# Patient Record
Sex: FEMALE | Race: ASIAN | NOT HISPANIC OR LATINO | Employment: FULL TIME | URBAN - METROPOLITAN AREA
[De-identification: names, ages, dates, MRNs, and addresses within clinical notes are randomized per-mention and may not be internally consistent; named-entity substitution may affect disease eponyms.]

---

## 2017-02-06 ENCOUNTER — OFFICE VISIT (OUTPATIENT)
Dept: INTERNAL MEDICINE | Facility: MEDICAL CENTER | Age: 22
End: 2017-02-06
Payer: COMMERCIAL

## 2017-02-06 VITALS
HEART RATE: 62 BPM | SYSTOLIC BLOOD PRESSURE: 106 MMHG | DIASTOLIC BLOOD PRESSURE: 70 MMHG | OXYGEN SATURATION: 98 % | TEMPERATURE: 98.6 F | RESPIRATION RATE: 12 BRPM | WEIGHT: 125.12 LBS | BODY MASS INDEX: 23.44 KG/M2

## 2017-02-06 DIAGNOSIS — N94.6 DYSMENORRHEA: ICD-10-CM

## 2017-02-06 PROCEDURE — 99213 OFFICE O/P EST LOW 20 MIN: CPT | Mod: GE | Performed by: INTERNAL MEDICINE

## 2017-02-06 NOTE — MR AVS SNAPSHOT
Ella Eldridge   2017 4:15 PM   Office Visit   MRN: [id number]    Department:  [facility]   Dept Phone:  [phone]    Description:  Female : 1995   Provider:  Luther Merino M.D.           Reason for Visit     Menstrual Problem Pain x6 yrs    Letter for School/Work FMLA Paperwork      Allergies as of 2017     No Known Allergies      You were diagnosed with     Dysmenorrhea   [625.3.ICD-9-CM]         Vital Signs     Blood Pressure Pulse Temperature Respirations Weight Oxygen Saturation    106/70 mmHg 62 37 °C (98.6 °F) 12 56.756 kg (125 lb 2 oz) 98%    Smoking Status                   Current Every Day Smoker           Basic Information     Date Of Birth Sex Race Ethnicity Preferred Language    1995 Female  Non- English      Your appointments     Mar 14, 2017  1:30 PM   New Patient with Davi Guzman M.D.   [facility] [address], [address]  [facility] [id number] [phone]              Problem List              ICD-10-CM Priority Class Noted - Resolved    Dysmenorrhea N94.6   2016 - Present    Vegan Z78.9   2016 - Present      Health Maintenance        Date Due Completion Dates    IMM HEP A VACCINE (1 of 2 - Standard Series) 1997 ---    IMM VARICELLA (CHICKENPOX) VACCINE (2 of 2 - 2 Dose Childhood Series) 10/10/2000 2000    IMM HPV VACCINE (1 of 3 - Female 3 Dose Series) 2007 ---    IMM DTaP/Tdap/Td Vaccine (6 - Tdap) 2007, 2000, 1997, 1996, 1996    IMM MENINGOCOCCAL VACCINE (MCV4) (1 of 1) 2012 ---    IMM INFLUENZA (1) 2016 ---            Current Immunizations     Dtap Vaccine 2001, 2000, 1997, 1996, 1996    HIB Vaccine(PEDVAX) 2000, 1997, 1996, 1996    Hepatitis B Vaccine Recombivax (Adol/Adult) 1997, 1996, 1996    IPV 2000, 2000    MMR Vaccine 2000, 2000    OPV -  Historical Data 1996, 1996    Varicella Vaccine Live 7/18/2000      Below and/or attached are the medications your provider expects you to take. Review all of your home medications and newly ordered medications with your provider and/or pharmacist. Follow medication instructions as directed by your provider and/or pharmacist. Please keep your medication list with you and share with your provider. Update the information when medications are discontinued, doses are changed, or new medications (including over-the-counter products) are added; and carry medication information at all times in the event of emergency situations     Allergies:  No Known Allergies          Medications  Valid as of: February 06, 2017 -  4:34 PM    Generic Name Brand Name Tablet Size Instructions for use    .                 Medicines prescribed today were sent to:     [facility] [address] [place] [address] - [address] [facility] [place]    [address] [facility] [address]    Phone: [phone] Fax: [phone]    Open 24 Hours?: Yes      Medication refill instructions:       If your prescription bottle indicates you have medication refills left, it is not necessary to call your provider’s office. Please contact your pharmacy and they will refill your medication.    If your prescription bottle indicates you do not have any refills left, you may request refills at any time through one of the following ways: The online TickTickTickets system (except Urgent Care), by calling your provider’s office, or by asking your pharmacy to contact your provider’s office with a refill request. Medication refills are processed only during regular business hours and may not be available until the next business day. Your provider may request additional information or to have a follow-up visit with you prior to refilling your medication.   *Please Note: Medication refills are assigned a new Rx number when refilled electronically. Your  pharmacy may indicate that no refills were authorized even though a new prescription for the same medication is available at the pharmacy. Please request the medicine by name with the pharmacy before contacting your provider for a refill.        Instructions    Get labs done  Go see gynecologist  Follow up with Dr. Jennifer brown          [employer] Access Code: [email or web address]  Expires: 2/13/2017  9:38 AM    [employer]  A secure, online tool to manage your health information     "[employer]’s [employer]® is a secure, online tool that connects you to your personalized health information from the privacy of your home -- day or night - making it very easy for you to manage your healthcare. Once the activation process is completed, you can even access your medical information using the [employer] estella, which is available for free in the Apple Estella store or Google Play store.     [employer] provides the following levels of access (as shown below):   My Chart Features   [facility] Primary Care Doctor [facility]  Specialists [facility]  Urgent  Care Non-[facility]  Primary Care  Doctor   Email your healthcare team securely and privately 24/7 X X X    Manage appointments: schedule your next appointment; view details of past/upcoming appointments X      Request prescription refills. X      View recent personal medical records, including lab and immunizations X X X X   View health record, including health history, allergies, medications X X X X   Read reports about your outpatient visits, procedures, consult and ER notes X X X X   See your discharge summary, which is a recap of your hospital and/or ER visit that includes your diagnosis, lab results, and care plan. X X       How to register for [employer]:  1. Go to  [email or web address].  2. Click on the Sign Up Now box, which takes you to the New Member Sign Up page. You will need to provide the following information:  a. Enter your [employer] Access Code exactly as it appears at the top of this  page. (You will not need to use this code after you’ve completed the sign-up process. If you do not sign up before the expiration date, you must request a new code.)   b. Enter your date of birth.   c. Enter your home email address.   d. Click Submit, and follow the next screen’s instructions.  3. Create a "[employer] ID. This will be your "[employer] login ID and cannot be changed, so think of one that is secure and easy to remember.  4. Create a "[employer] password. You can change your password at any time.  5. Enter your Password Reset Question and Answer. This can be used at a later time if you forget your password.   6. Enter your e-mail address. This allows you to receive e-mail notifications when new information is available in "[employer]  7. Click Sign Up. You can now view your health information.    For assistance activating your "[employer] account, call [phone]

## 2017-02-07 NOTE — PROGRESS NOTES
Established Patient    Ella presents today with the following:    CC: Dysmenorrhea    HPI: Patient is a 20 yo F with a past medical history significant for dysmenorrhea which has been occuring over the past 2 years. She recently established with PCP and began workup for this. She has labwork ordered and gynecology referral in but has not done either yet. She misses work about 2 days out of every month and is here for paperwork for her work excusing her while this workup is being performed. Patient otherwise denies any chest pain, shortness of breath, nausea, vomiting, dizziness, abdominal pain, fever, chills, loss of consciousness, headache, bleeding, seizure or dysuria.    Patient Active Problem List    Diagnosis Date Noted   • Dysmenorrhea 12/05/2016   • Vegan 12/05/2016       No current outpatient prescriptions on file.     No current facility-administered medications for this visit.       ROS: As per HPI. Additional pertinent symptoms as noted below.    /70 mmHg  Pulse 62  Temp(Src) 37 °C (98.6 °F)  Resp 12  Wt 56.756 kg (125 lb 2 oz)  SpO2 98%    Physical Exam   Constitutional:  oriented to person, place, and time. No distress.   Eyes: Pupils are equal, round, and reactive to light. No scleral icterus.  Neck: Neck supple. No thyromegaly present.   Cardiovascular: Normal rate, regular rhythm and normal heart sounds.  Exam reveals no gallop and no friction rub.  No murmur heard.  Pulmonary/Chest: Breath sounds normal. Chest wall is not dull to percussion.   Musculoskeletal:   no edema.   Lymphadenopathy: no cervical adenopathy  Neurological: alert and oriented to person, place, and time.   Skin: No cyanosis. Nails show no clubbing.    Assessment and Plan    Problem List Items Addressed This Visit     Dysmenorrhea     Currently being worked up, has not had labwork performed or seen gynecology  Filled out work form to excuse once a month for 2 days for the next 6 months while workup is  performed  Patient needs to complete workup, renewal of leave will need to be performed by gynecology                 Followup: No Follow-up on file.      Signed by: Luther Merino M.D.

## 2017-02-07 NOTE — ASSESSMENT & PLAN NOTE
Currently being worked up, has not had labwork performed or seen gynecology  Filled out work form to excuse once a month for 2 days for the next 6 months while workup is performed  Patient needs to complete workup, renewal of leave will need to be performed by gynecology

## 2018-05-15 ENCOUNTER — OFFICE VISIT (OUTPATIENT)
Dept: INTERNAL MEDICINE | Facility: MEDICAL CENTER | Age: 23
End: 2018-05-15
Payer: COMMERCIAL

## 2018-05-15 VITALS
TEMPERATURE: 98.7 F | DIASTOLIC BLOOD PRESSURE: 58 MMHG | HEIGHT: 61 IN | HEART RATE: 62 BPM | OXYGEN SATURATION: 98 % | BODY MASS INDEX: 23.98 KG/M2 | SYSTOLIC BLOOD PRESSURE: 96 MMHG | WEIGHT: 127 LBS

## 2018-05-15 DIAGNOSIS — Z78.9 VEGAN: ICD-10-CM

## 2018-05-15 DIAGNOSIS — N94.6 DYSMENORRHEA: ICD-10-CM

## 2018-05-15 DIAGNOSIS — R53.1 WEAKNESS: ICD-10-CM

## 2018-05-15 DIAGNOSIS — E46 PROTEIN-CALORIE MALNUTRITION, UNSPECIFIED SEVERITY (HCC): ICD-10-CM

## 2018-05-15 PROCEDURE — 99213 OFFICE O/P EST LOW 20 MIN: CPT | Mod: GE | Performed by: INTERNAL MEDICINE

## 2018-05-15 ASSESSMENT — PATIENT HEALTH QUESTIONNAIRE - PHQ9
5. POOR APPETITE OR OVEREATING: 1 - SEVERAL DAYS
CLINICAL INTERPRETATION OF PHQ2 SCORE: 1
SUM OF ALL RESPONSES TO PHQ QUESTIONS 1-9: 8

## 2018-05-15 NOTE — PROGRESS NOTES
Established Patient    Ella presents today with the following:    CC: Weakness, fatigue, dietary issues, pre-menstrual syndrome    HPI: 22 year old female with a PMH of veganism and pre-menstrual syndrome presents to the clinic today with dietary questions and complaints of painful menstruation.    Veganism: Miss Tuttle was previously seen by myself in 12/2016 with complaints stemming from the fact that she was vegan, but she had many dietary questions and was worried about dietary deficiencies. She has adhered to a strict vegan diet for the past 5 years and is concerned about her nutritional status and whether its causing any deficiencies. She began practicing veganism as an attempt to become more healthy and lose weight. She eats lots of raw fresh fruits and vegetables, rice and avocados, but notes a lack of variation in her diet and consuming only around 1000 calories. She does not consistently take those vitamin replacment therapy. Recently the patient has noticed that she is becoming more weak, dizzy and fatigued than usual especially during rigorous exercise and she is suspicious that she is not getting enough nutrients from her diet. She has an exercise regimen that consisting of cardio and strength training 5 days a week for 30 minutes. She is requesting a referral to a nutritionist for dietary help.     Dysmenorrhea: For the past 3 years the patient states that she has had painful menses that has caused her to miss work once every once in a while, but for the most part her work is understanding of her plight.. Her pain is located suprapubic without radiation and is rated at 5/10 at its worst. Her menses usually last 4-5 days, but she states that only the first day is painful and has been using ibuprofen for pain control with poor response. She describes the pain as dull and crippling. She states that she is currently menstruating and there is no chance that she is pregnant. Her periods began at age 13  "and she has never used birth control, nor is she interesting in initiating it at today' visit. She denies menorrhagia and currently has no urinary symptoms. Family history is positive for heavy, painful menses in her mother.     Patient Active Problem List    Diagnosis Date Noted   • Malnourished (HCC) 05/15/2018   • Dysmenorrhea 12/05/2016   • Vegan 12/05/2016       No current outpatient prescriptions on file.     No current facility-administered medications for this visit.        ROS: As per HPI. Additional pertinent symptoms as noted below.        BP (!) 96/58   Pulse 62   Temp 37.1 °C (98.7 °F)   Ht 1.556 m (5' 1.26\")   Wt 57.6 kg (127 lb)   SpO2 98%   BMI 23.79 kg/m²     Physical Exam   General:  Alert and oriented, No apparent distress. Appears stated age, skin abnormally pale.  Eyes: Pupils equal and reactive, EOMI, no scleral icterus, no injected conjunctiva.  Throat: Clear no erythema or exudates noted.  Neck: Supple. No lymphadenopathy noted. Thyroid not enlarged.  Lungs: Clear to auscultation and percussion bilaterally. No wheezes, crackles, rhales.  Cardiovascular: Regular rate and rhythm. No murmurs, rubs or gallops.  Abdomen:  Benign. No rebound or guarding noted.  Extremities: No clubbing, cyanosis, edema.  Skin: Clear. No rash or suspicious skin lesions noted. Pale.      Assessment and Plan    1. Vegan  - Suspect dietary malnutrition contributing to weakness, fatigue, dizziness. Does appear pale.  - Diet consists of vegan menu with 1000 calories daily  - Denies any bulimia or anorexia  - Nutrition referral  - Check CBC for anemia, CMP for electrolyte abnormalities, B12, folate, Fe panel, Vit D, TSH, pre-albumin     2. Dysmenorrhea  - Ibuprofen PRN pain  - Refused oral contraception  - Offer SSRI at next visit  - Needs PAP. Offer at next visit.    PLAN: Check CBC, CMP, Fe panel, B12, folate, Vit D, pre-albumin. Refer to nutrition services. F/u in 5 weeks. Offer PAP at next visit (previously " refused).        Signed by: Keith Rodriguez M.D.

## 2018-05-26 ENCOUNTER — HOSPITAL ENCOUNTER (OUTPATIENT)
Dept: LAB | Facility: MEDICAL CENTER | Age: 23
End: 2018-05-26
Attending: INTERNAL MEDICINE
Payer: COMMERCIAL

## 2018-05-26 LAB
ALBUMIN SERPL BCP-MCNC: 4.2 G/DL (ref 3.2–4.9)
ALBUMIN/GLOB SERPL: 1.7 G/DL
ALP SERPL-CCNC: 52 U/L (ref 30–99)
ALT SERPL-CCNC: 17 U/L (ref 2–50)
ANION GAP SERPL CALC-SCNC: 7 MMOL/L (ref 0–11.9)
AST SERPL-CCNC: 22 U/L (ref 12–45)
BASOPHILS # BLD AUTO: 0.9 % (ref 0–1.8)
BASOPHILS # BLD: 0.05 K/UL (ref 0–0.12)
BILIRUB SERPL-MCNC: 1 MG/DL (ref 0.1–1.5)
BUN SERPL-MCNC: 9 MG/DL (ref 8–22)
CALCIUM SERPL-MCNC: 9.4 MG/DL (ref 8.5–10.5)
CHLORIDE SERPL-SCNC: 107 MMOL/L (ref 96–112)
CO2 SERPL-SCNC: 26 MMOL/L (ref 20–33)
CREAT SERPL-MCNC: 0.63 MG/DL (ref 0.5–1.4)
EOSINOPHIL # BLD AUTO: 0.19 K/UL (ref 0–0.51)
EOSINOPHIL NFR BLD: 3.5 % (ref 0–6.9)
ERYTHROCYTE [DISTWIDTH] IN BLOOD BY AUTOMATED COUNT: 40.4 FL (ref 35.9–50)
FERRITIN SERPL-MCNC: 8.8 NG/ML (ref 10–291)
FOLATE SERPL-MCNC: >24 NG/ML
GLOBULIN SER CALC-MCNC: 2.5 G/DL (ref 1.9–3.5)
GLUCOSE SERPL-MCNC: 84 MG/DL (ref 65–99)
HCT VFR BLD AUTO: 41.6 % (ref 37–47)
HGB BLD-MCNC: 13.8 G/DL (ref 12–16)
IMM GRANULOCYTES # BLD AUTO: 0.01 K/UL (ref 0–0.11)
IMM GRANULOCYTES NFR BLD AUTO: 0.2 % (ref 0–0.9)
IRON SATN MFR SERPL: 50 % (ref 15–55)
IRON SERPL-MCNC: 221 UG/DL (ref 40–170)
LYMPHOCYTES # BLD AUTO: 2.36 K/UL (ref 1–4.8)
LYMPHOCYTES NFR BLD: 43.6 % (ref 22–41)
MCH RBC QN AUTO: 30.1 PG (ref 27–33)
MCHC RBC AUTO-ENTMCNC: 33.2 G/DL (ref 33.6–35)
MCV RBC AUTO: 90.8 FL (ref 81.4–97.8)
MONOCYTES # BLD AUTO: 0.33 K/UL (ref 0–0.85)
MONOCYTES NFR BLD AUTO: 6.1 % (ref 0–13.4)
NEUTROPHILS # BLD AUTO: 2.47 K/UL (ref 2–7.15)
NEUTROPHILS NFR BLD: 45.7 % (ref 44–72)
NRBC # BLD AUTO: 0 K/UL
NRBC BLD-RTO: 0 /100 WBC
PLATELET # BLD AUTO: 175 K/UL (ref 164–446)
PMV BLD AUTO: 11.2 FL (ref 9–12.9)
POTASSIUM SERPL-SCNC: 4 MMOL/L (ref 3.6–5.5)
PREALB SERPL-MCNC: 18 MG/DL (ref 18–38)
PROT SERPL-MCNC: 6.7 G/DL (ref 6–8.2)
RBC # BLD AUTO: 4.58 M/UL (ref 4.2–5.4)
SODIUM SERPL-SCNC: 140 MMOL/L (ref 135–145)
T4 FREE SERPL-MCNC: 0.93 NG/DL (ref 0.53–1.43)
TIBC SERPL-MCNC: 440 UG/DL (ref 250–450)
TSH SERPL DL<=0.005 MIU/L-ACNC: 3.17 UIU/ML (ref 0.38–5.33)
VIT B12 SERPL-MCNC: 603 PG/ML (ref 211–911)
WBC # BLD AUTO: 5.4 K/UL (ref 4.8–10.8)

## 2018-05-26 PROCEDURE — 84134 ASSAY OF PREALBUMIN: CPT

## 2018-05-26 PROCEDURE — 84439 ASSAY OF FREE THYROXINE: CPT

## 2018-05-26 PROCEDURE — 82746 ASSAY OF FOLIC ACID SERUM: CPT

## 2018-05-26 PROCEDURE — 85025 COMPLETE CBC W/AUTO DIFF WBC: CPT

## 2018-05-26 PROCEDURE — 84443 ASSAY THYROID STIM HORMONE: CPT

## 2018-05-26 PROCEDURE — 82607 VITAMIN B-12: CPT

## 2018-05-26 PROCEDURE — 83540 ASSAY OF IRON: CPT

## 2018-05-26 PROCEDURE — 80053 COMPREHEN METABOLIC PANEL: CPT

## 2018-05-26 PROCEDURE — 82728 ASSAY OF FERRITIN: CPT

## 2018-05-26 PROCEDURE — 83550 IRON BINDING TEST: CPT

## 2018-05-26 PROCEDURE — 36415 COLL VENOUS BLD VENIPUNCTURE: CPT

## 2018-06-19 ENCOUNTER — OFFICE VISIT (OUTPATIENT)
Dept: INTERNAL MEDICINE | Facility: MEDICAL CENTER | Age: 23
End: 2018-06-19
Payer: COMMERCIAL

## 2018-06-19 VITALS
OXYGEN SATURATION: 98 % | SYSTOLIC BLOOD PRESSURE: 101 MMHG | HEART RATE: 60 BPM | TEMPERATURE: 99.2 F | BODY MASS INDEX: 24.35 KG/M2 | RESPIRATION RATE: 14 BRPM | HEIGHT: 61 IN | DIASTOLIC BLOOD PRESSURE: 69 MMHG | WEIGHT: 129 LBS

## 2018-06-19 DIAGNOSIS — N94.6 DYSMENORRHEA: ICD-10-CM

## 2018-06-19 DIAGNOSIS — Z78.9 VEGAN: ICD-10-CM

## 2018-06-19 PROCEDURE — 99213 OFFICE O/P EST LOW 20 MIN: CPT | Mod: GE | Performed by: INTERNAL MEDICINE

## 2018-06-20 PROBLEM — E46 MALNOURISHED (HCC): Status: RESOLVED | Noted: 2018-05-15 | Resolved: 2018-06-20

## 2018-06-20 NOTE — PROGRESS NOTES
"Established Patient    Ella presents today with the following:    CC: 5 week follow up for dietary issues, pre-menstrual syndrome, lab review    HPI: 22 year old female with a PMH of veganism and pre-menstrual syndrome presents to the clinic today for lab review and mgmt of vegan diet.     Veganism: Labs appear within normal limits, pre-albumin was on the low side of normal, patient feels improved and has made some dietary questions, but wanted to review her labs prior to scheduling a nutrition appt. A dietary referral was placed previously per patient request. At this time she would like to pursue an appt as she would like to maintain her current vegan lifestyle, but would like dietary help.     Dysmenorrhea: Better controlled since last being seen. She continues to refuse any treatment for her symptoms including birth control. Patient was offered a vaginal exam with PAP smear and did not wish to pursue that currently. A referral was placed for gynecology.    Patient Active Problem List    Diagnosis Date Noted   • Dysmenorrhea 12/05/2016   • Vegan 12/05/2016       No current outpatient prescriptions on file.     No current facility-administered medications for this visit.        ROS: As per HPI. Additional pertinent symptoms as noted below.      /69   Pulse 60   Temp 37.3 °C (99.2 °F)   Resp 14   Ht 1.556 m (5' 1.26\")   Wt 58.5 kg (129 lb)   SpO2 98%   BMI 24.17 kg/m²     Physical Exam   General:  Alert and oriented, No apparent distress. Appears stated age, skin abnormally pale.  Eyes: Pupils equal and reactive, EOMI, no scleral icterus, no injected conjunctiva.  Throat: Clear no erythema or exudates noted.  Neck: Supple. No lymphadenopathy noted. Thyroid not enlarged.  Lungs: Clear to auscultation and percussion bilaterally. No wheezes, crackles, rhales.  Cardiovascular: Regular rate and rhythm. No murmurs, rubs or gallops.  Abdomen:  Benign. No rebound or guarding noted.  Extremities: No " clubbing, cyanosis, edema.  Skin: Clear. No rash or suspicious skin lesions noted. Pale.      Assessment and Plan    1. Vegan  - Pre-albumin borderline low  - Diet consists of vegan menu with 1000 calories daily  - Denies any bulimia or anorexia  - Nutrition referral placed, patient still wishes to proceed as she needs help with diet  - Vitamins normal, no replacement necessary     2. Dysmenorrhea  - Ibuprofen PRN pain  - Refused oral contraception  - Offered SSRI for dysmenorrhea/depression, patient refused.  - Needs PAP. Refused at todays visit, but agreed to a gyneceology referral to establish.     PLAN: Referred to gyn, follow up PRN.      Signed by: Keith Rodriguez M.D.

## 2018-08-10 ENCOUNTER — OFFICE VISIT (OUTPATIENT)
Dept: INTERNAL MEDICINE | Facility: MEDICAL CENTER | Age: 23
End: 2018-08-10
Payer: COMMERCIAL

## 2018-08-10 VITALS
SYSTOLIC BLOOD PRESSURE: 106 MMHG | TEMPERATURE: 98.4 F | HEIGHT: 61 IN | OXYGEN SATURATION: 97 % | DIASTOLIC BLOOD PRESSURE: 68 MMHG | HEART RATE: 67 BPM | WEIGHT: 129 LBS | BODY MASS INDEX: 24.35 KG/M2

## 2018-08-10 DIAGNOSIS — T14.8XXA MUSCULOSKELETAL STRAIN: ICD-10-CM

## 2018-08-10 DIAGNOSIS — Z87.440 HISTORY OF UTI: ICD-10-CM

## 2018-08-10 DIAGNOSIS — M54.50 ACUTE LEFT-SIDED LOW BACK PAIN WITHOUT SCIATICA: ICD-10-CM

## 2018-08-10 LAB
APPEARANCE UR: CLEAR
COLOR UR AUTO: NORMAL
GLUCOSE UR STRIP.AUTO-MCNC: NORMAL MG/DL
KETONES UR STRIP.AUTO-MCNC: NORMAL MG/DL
LEUKOCYTE ESTERASE UR QL STRIP.AUTO: NORMAL
NITRITE UR QL STRIP.AUTO: NORMAL
PH UR STRIP.AUTO: 7 [PH] (ref 5–8)
PROT UR QL STRIP: NORMAL MG/DL
RBC UR QL AUTO: NORMAL
SP GR UR STRIP.AUTO: 1.01

## 2018-08-10 PROCEDURE — 99213 OFFICE O/P EST LOW 20 MIN: CPT | Mod: GE | Performed by: INTERNAL MEDICINE

## 2018-08-10 PROCEDURE — 81002 URINALYSIS NONAUTO W/O SCOPE: CPT | Mod: GC | Performed by: INTERNAL MEDICINE

## 2018-08-10 NOTE — LETTER
August 10, 2018    To Whom It May Concern:         This is confirmation that Ella Eldridge attended her scheduled appointment with Tye Brown M.D. on 8/10/18.         If you have any questions please do not hesitate to call me at the phone number listed below.    Sincerely,          Tye Brown M.D.  [phone]

## 2018-08-10 NOTE — PROGRESS NOTES
"      Established Patient    Ella presents today with the following:    CC: UTI like symptoms    HPI: pt is a 22 YO female PMH of veganism and pre-menstrual syndrome presents to the clinic for acute L sided back pain, with previous color changes of urine making her suspicious of having UTI. Pt has had UTI 4 yrs back, with similar sxs with fever ans chills.   Pt denies F/C, N/V, dysuria or urine color changes, although a week back she had dark urine which improved with hydration. No hx of renal stones.    [place] and having back pain since yesterday. No other joint pain     Patient Active Problem List    Diagnosis Date Noted   • Dysmenorrhea 12/05/2016   • Vegan 12/05/2016       No current outpatient prescriptions on file.     No current facility-administered medications for this visit.        ROS: As per HPI. Additional pertinent symptoms as noted below.    Constitutional: negative for fever/ chills,  Malaise, weight loss and diaphoresis.   HENT: Negative for nosebleeds and sore throat.    Eyes: Negative for blurred vision and redness, vision loss  Respiratory: Negative for cough, hemoptysis and shortness of breath.    Cardiovascular: Negative for chest pain, palpitations, orthopnea, VELASQUEZ and leg swelling   Gastrointestinal: Negative for heartburn, nausea, vomiting and abdominal pain.   Genitourinary: Negative for dysuria, frequency and hematuria.   Musculoskeletal: negative for joint swelling, pain. Negative for myalgias.  Skin: Negative for itching and rash.   Neurological: Negative for dizziness, tingling, sensory change, focal weakness, seizures and weakness.   Endo/Heme/Allergies: Does not bruise/bleed easily.   Psychiatric/Behavioral: Negative for depression and substance abuse.       /68   Pulse 67   Temp 37.4 °C (99.4 °F)   Ht 1.556 m (5' 1.26\")   Wt 58.5 kg (129 lb)   SpO2 97%   BMI 24.17 kg/m²     Physical Exam   Constitutional:  oriented to person, place, " and time. No distress.   Eyes: Pupils are equal, round, and reactive to light. No scleral icterus.  Neck: Neck supple. No thyromegaly present.   Cardiovascular: Normal rate, regular rhythm and normal heart sounds.  Exam reveals no gallop and no friction rub.  No murmur heard.  Pulmonary/Chest: Breath sounds normal. Chest wall is not dull to percussion.   Abdomen: Soft nontender, no suprapubic tenderness  CVA- reproducible right flank pain.  Neurological: alert and oriented to person, place, and time.   Skin: No cyanosis. Nails show no clubbing.    Note: I have reviewed all pertinent labs and diagnostic tests associated with this visit with specific comments listed under the assessment and plan below    Assessment and Plan    1. Musculoskeletal strain  2. Acute left-sided low back pain without sciatica  3. History of UTI  - pain appears to be MSK from acute strain being in the river.   - no dysuria, POC UA was negative for bactiuria, RBC, nitrates, LE. No tachycardia No stigma of UTI.  - Reassurance, heat and ice application, NSAIDs        Followup: Return if symptoms worsen or fail to improve.      Signed by: Tye Brown M.D.

## 2019-07-03 ENCOUNTER — HOSPITAL ENCOUNTER (OUTPATIENT)
Facility: MEDICAL CENTER | Age: 24
End: 2019-07-03
Attending: NURSE PRACTITIONER
Payer: COMMERCIAL

## 2019-07-03 ENCOUNTER — OFFICE VISIT (OUTPATIENT)
Dept: URGENT CARE | Facility: CLINIC | Age: 24
End: 2019-07-03
Payer: COMMERCIAL

## 2019-07-03 VITALS
BODY MASS INDEX: 23.41 KG/M2 | HEIGHT: 61 IN | OXYGEN SATURATION: 99 % | HEART RATE: 101 BPM | DIASTOLIC BLOOD PRESSURE: 64 MMHG | SYSTOLIC BLOOD PRESSURE: 112 MMHG | RESPIRATION RATE: 16 BRPM | WEIGHT: 124 LBS | TEMPERATURE: 99.2 F

## 2019-07-03 DIAGNOSIS — J02.9 EXUDATIVE PHARYNGITIS: ICD-10-CM

## 2019-07-03 LAB
INT CON NEG: NEGATIVE
INT CON POS: POSITIVE
S PYO AG THROAT QL: NEGATIVE

## 2019-07-03 PROCEDURE — 99204 OFFICE O/P NEW MOD 45 MIN: CPT | Performed by: NURSE PRACTITIONER

## 2019-07-03 PROCEDURE — 87880 STREP A ASSAY W/OPTIC: CPT | Performed by: NURSE PRACTITIONER

## 2019-07-03 PROCEDURE — 87070 CULTURE OTHR SPECIMN AEROBIC: CPT

## 2019-07-03 RX ORDER — PENICILLIN V POTASSIUM 500 MG/1
500 TABLET ORAL 3 TIMES DAILY
Qty: 30 TAB | Refills: 0 | Status: SHIPPED | OUTPATIENT
Start: 2019-07-03 | End: 2019-07-13

## 2019-07-03 NOTE — PROGRESS NOTES
Chief Complaint   Patient presents with   • Pharyngitis     x 1 day and states she noticed white spots on her tonsils and a little feverish last night       HISTORY OF PRESENT ILLNESS: Patient is a 23 y.o. female who presents today due to complaints of a sore throat for the past two days. Reports associated fever, chills, pain with swallowing. Pain is currently rated as mild. Denies associated congestion, cough, or difficulty breathing. She has tried OTC medications at home without much improvement. Notes several of her friends are ill with similar symptoms.      Patient Active Problem List    Diagnosis Date Noted   • Dysmenorrhea 12/05/2016   • Vegan 12/05/2016       Allergies:Patient has no known allergies.    Current Outpatient Prescriptions Ordered in [employer]   Medication Sig Dispense Refill   • penicillin v potassium (VEETID) 500 MG Tab Take 1 Tab by mouth 3 times a day for 10 days. 30 Tab 0     No current Epic-ordered facility-administered medications on file.        No past medical history on file.    Social History   Substance Use Topics   • Smoking status: Former Smoker     Packs/day: 0.25     Years: 8.00     Types: Cigarettes     Quit date: 5/15/2017   • Smokeless tobacco: Never Used   • Alcohol use Yes      Comment: occa       No family status information on file.   No family history on file.    ROS:  Review of Systems   Constitutional: Positive for fever, chills. Negative for weight loss and malaise/fatigue.   HENT: Positive for sore throat. Negative for ear pain, nosebleeds, congestion.   Eyes: Negative for vision changes.   Cardiovascular: Negative for chest pain, palpitations, orthopnea and leg swelling.   Respiratory: Negative for cough, sputum production, shortness of breath and wheezing.   Gastrointestinal: Negative for abdominal pain, nausea, vomiting or diarrhea.   Skin: Negative for rash, diaphoresis.     Exam:  /64 (BP Location: Left arm, Patient Position: Sitting, BP Cuff Size: Adult)    "Pulse (!) 101   Temp 37.3 °C (99.2 °F) (Temporal)   Resp 16   Ht 1.549 m (5' 1\")   Wt 56.2 kg (124 lb)   SpO2 99%   General: well-nourished, well-developed female in NAD  Head: normocephalic, atraumatic  Eyes: PERRLA, no conjunctival injection, acuity grossly intact, lids normal.  Ears: normal shape and symmetry, no tenderness, no discharge. External canals are without any significant edema or erythema. Tympanic membranes are without any inflammation, no effusion. Gross auditory acuity is intact.  Nose: symmetrical without tenderness, no discharge.  Mouth/Throat: reasonable hygiene. There is erythema, with exudates and tonsillar enlargement present.  Neck: no masses, range of motion within normal limits, no tracheal deviation. No obvious thyroid enlargement.   Lymph: bilateral anterior cervical adenopathy. No supraclavicular adenopathy.   Neuro: alert and oriented. Cranial nerves 1-12 grossly intact. No sensory deficit.   Cardiovascular: regular rate and rhythm. No edema.  Pulmonary: no distress. Chest is symmetrical with respiration, no wheezes, crackles, or rhonchi.   Musculoskeletal: no clubbing, appropriate muscle tone, gait is stable.  Skin: warm, dry, intact, no clubbing, no cyanosis, no rashes.   Psych: appropriate mood, affect, judgement.         Assessment/Plan:  1. Exudative pharyngitis  POCT Rapid Strep A    penicillin v potassium (VEETID) 500 MG Tab    CULTURE THROAT         POC strep negative      Antibiotic as directed due to appearance, culture obtained. Call phone number for results. OTC motrin or tylenol for pain/fever control. Hand hygiene. Increase fluid intake, rest. Warm salt water gargles.   Supportive care, differential diagnoses, and indications for immediate follow-up discussed with patient.   Pathogenesis of diagnosis discussed including typical length and natural progression.   Instructed to return to clinic or nearest emergency department for any change in condition, further " concerns, or worsening of symptoms.  Patient states understanding of the plan of care and discharge instructions.  Instructed to make an appointment, for follow up, with her primary care provider.        Please note that this dictation was created using voice recognition software. I have made every reasonable attempt to correct obvious errors, but I expect that there are errors of grammar and possibly content that I did not discover before finalizing the note.      DUNCAN Waller.

## 2019-07-05 LAB
BACTERIA SPEC RESP CULT: NORMAL
SIGNIFICANT IND 70042: NORMAL
SITE SITE: NORMAL
SOURCE SOURCE: NORMAL

## 2024-08-19 ENCOUNTER — OFFICE VISIT (OUTPATIENT)
Dept: MEDICAL GROUP | Facility: MEDICAL CENTER | Age: 29
End: 2024-08-19
Payer: COMMERCIAL

## 2024-08-19 VITALS
SYSTOLIC BLOOD PRESSURE: 82 MMHG | BODY MASS INDEX: 27.47 KG/M2 | WEIGHT: 145.5 LBS | HEIGHT: 61 IN | DIASTOLIC BLOOD PRESSURE: 58 MMHG

## 2024-08-19 DIAGNOSIS — Z76.89 ENCOUNTER TO ESTABLISH CARE: ICD-10-CM

## 2024-08-19 DIAGNOSIS — Z00.00 ENCOUNTER FOR PREVENTIVE CARE: ICD-10-CM

## 2024-08-19 DIAGNOSIS — H93.8X2 SENSATION OF FULLNESS IN LEFT EAR: ICD-10-CM

## 2024-08-19 DIAGNOSIS — N94.6 DYSMENORRHEA: ICD-10-CM

## 2024-08-19 PROCEDURE — 99214 OFFICE O/P EST MOD 30 MIN: CPT | Performed by: STUDENT IN AN ORGANIZED HEALTH CARE EDUCATION/TRAINING PROGRAM

## 2024-08-19 PROCEDURE — 3074F SYST BP LT 130 MM HG: CPT | Performed by: STUDENT IN AN ORGANIZED HEALTH CARE EDUCATION/TRAINING PROGRAM

## 2024-08-19 PROCEDURE — 3078F DIAST BP <80 MM HG: CPT | Performed by: STUDENT IN AN ORGANIZED HEALTH CARE EDUCATION/TRAINING PROGRAM

## 2024-08-19 ASSESSMENT — ENCOUNTER SYMPTOMS
PALPITATIONS: 0
FEVER: 0
CHILLS: 0
HEMOPTYSIS: 0
COUGH: 0

## 2024-08-19 ASSESSMENT — PATIENT HEALTH QUESTIONNAIRE - PHQ9: CLINICAL INTERPRETATION OF PHQ2 SCORE: 0

## 2024-08-19 NOTE — PROGRESS NOTES
Ella Eldridge is a 29 y.o. old female  who is here to establish care, referral to gynecology and ear fullness    Chief Complaint   Patient presents with    Establish Care    Ear Fullness        Problem   Sensation of Fullness in Left Ear    Reports sensation of fullness in her left ear  No hearing loss, no ear pain, no ear discharge     Encounter to Establish Care   Dysmenorrhea    Reports severe pain with menstrual cycles.  Pain starts on the onset of menstrual cycles and last for about 2 to 3 days.  She has been taking ibuprofen in the last few months which has helped the pain.  No increased bleeding during menstrual cycles or increased frequency of bleeding.           Review of Systems   Constitutional:  Negative for chills and fever.   Respiratory:  Negative for cough and hemoptysis.    Cardiovascular:  Negative for chest pain and palpitations.   Genitourinary:  Negative for dysuria.        Patient  has no past medical history on file.  Patient  has no past surgical history on file.  Family History   Problem Relation Age of Onset    Diabetes Mother     Schizophrenia Maternal Uncle     Diabetes Maternal Grandmother     Prostate cancer Maternal Grandfather     Parkinson's Disease Paternal Grandmother      Social History     Tobacco Use    Smoking status: Former     Current packs/day: 0.00     Average packs/day: 0.3 packs/day for 8.0 years (2.0 ttl pk-yrs)     Types: Cigarettes     Start date: 5/15/2009     Quit date: 5/15/2017     Years since quittin.2    Smokeless tobacco: Never   Vaping Use    Vaping status: Never Used   Substance Use Topics    Alcohol use: Yes     Comment: occa    Drug use: Not Currently     Types: Marijuana     Patient Active Problem List    Diagnosis Date Noted    Sensation of fullness in left ear 2024    Encounter to establish care 2024    Dysmenorrhea 2016    Vegan 2016     No current outpatient medications on file.     No current facility-administered  "medications for this visit.    (including changes today)  Allergies: Patient has no known allergies.    BP (!) 82/58   Ht 1.549 m (5' 1\")   Wt 66 kg (145 lb 8.1 oz)      Physical Exam  Constitutional:       Appearance: Normal appearance.   HENT:      Head: Normocephalic and atraumatic.      Comments: Increased cerumen in left ear     Right Ear: Tympanic membrane normal.      Left Ear: Tympanic membrane normal.   Cardiovascular:      Rate and Rhythm: Normal rate and regular rhythm.      Pulses: Normal pulses.      Heart sounds: No murmur heard.  Pulmonary:      Effort: Pulmonary effort is normal. No respiratory distress.      Breath sounds: Normal breath sounds. No wheezing.   Abdominal:      Palpations: Abdomen is soft.      Tenderness: There is no abdominal tenderness.   Skin:     General: Skin is warm.      Coloration: Skin is not jaundiced or pale.   Neurological:      General: No focal deficit present.      Mental Status: She is alert and oriented to person, place, and time.          Health maintenance:   Pap smear: Patient would like to schedule with her gynecologist  Bro Montoyaap-patient deferred at this time, wants to postpone.    No recent lab results to review    Assessment and plan:    Problem List Items Addressed This Visit       Dysmenorrhea     - Symptoms suggestive of for physiologic dysmenorrhea.  However patient states that her symptoms are severe and debilitating.  Also requesting referral to gynecology.  Discussed with patient regarding option for hormonal contraceptive pill, patient deferred.         Relevant Orders    Referral to Gynecology    Sensation of fullness in left ear     -Ear lavage         Relevant Orders    Ear Irrigation ([facility] Only)    Encounter to establish care     Reviewed past medical, surgical, family, personal history and medications.            Other Visit Diagnoses       Encounter for preventive care        Relevant Orders    Comp Metabolic Panel    CBC WITH DIFFERENTIAL "    Lipid Profile    TSH    VITAMIN D,25 HYDROXY (DEFICIENCY)    HEMOGLOBIN A1C    HIV AG/AB COMBO ASSAY SCREENING    HEP C VIRUS ANTIBODY                Return if symptoms worsen or fail to improve, for Labfollowup.          Please note that this dictation was created using voice recognition software. I have made every reasonable attempt to correct obvious errors, but I expect that there are errors of grammar and possibly content that I did not discover before finalizing the note.

## 2024-08-19 NOTE — ASSESSMENT & PLAN NOTE
- Symptoms suggestive of for physiologic dysmenorrhea.  However patient states that her symptoms are severe and debilitating.  Also requesting referral to gynecology.  Discussed with patient regarding option for hormonal contraceptive pill, patient deferred.

## 2024-08-19 NOTE — LETTER
August 19, 2024       Patient: Ella Eldridge   YOB: 1995   Date of Visit: 8/19/2024         To Whom It May Concern:    Ms. Ella Eldridge was at my office for her appointment at 8.20 AM. In my medical opinion, I recommend that Ella Eldridge can return to work after the appointment.    If you have any questions or concerns, please don't hesitate to call [phone]          Sincerely,          Latasha Coronel M.D.  Electronically Signed

## 2025-04-02 ENCOUNTER — HOSPITAL ENCOUNTER (OUTPATIENT)
Facility: MEDICAL CENTER | Age: 30
End: 2025-04-02
Attending: STUDENT IN AN ORGANIZED HEALTH CARE EDUCATION/TRAINING PROGRAM
Payer: COMMERCIAL

## 2025-04-02 LAB
25(OH)D3 SERPL-MCNC: 17 NG/ML (ref 30–100)
ALBUMIN SERPL BCP-MCNC: 4.5 G/DL (ref 3.2–4.9)
ALBUMIN/GLOB SERPL: 1.6 G/DL
ALP SERPL-CCNC: 54 U/L (ref 30–99)
ALT SERPL-CCNC: 17 U/L (ref 2–50)
ANION GAP SERPL CALC-SCNC: 9 MMOL/L (ref 7–16)
AST SERPL-CCNC: 20 U/L (ref 12–45)
BASOPHILS # BLD AUTO: 0.8 % (ref 0–1.8)
BASOPHILS # BLD: 0.07 K/UL (ref 0–0.12)
BILIRUB SERPL-MCNC: 0.5 MG/DL (ref 0.1–1.5)
BUN SERPL-MCNC: 7 MG/DL (ref 8–22)
CALCIUM ALBUM COR SERPL-MCNC: 8.9 MG/DL (ref 8.5–10.5)
CALCIUM SERPL-MCNC: 9.3 MG/DL (ref 8.5–10.5)
CHLORIDE SERPL-SCNC: 103 MMOL/L (ref 96–112)
CHOLEST SERPL-MCNC: 170 MG/DL (ref 100–199)
CO2 SERPL-SCNC: 25 MMOL/L (ref 20–33)
CREAT SERPL-MCNC: 0.57 MG/DL (ref 0.5–1.4)
EOSINOPHIL # BLD AUTO: 0.26 K/UL (ref 0–0.51)
EOSINOPHIL NFR BLD: 3 % (ref 0–6.9)
ERYTHROCYTE [DISTWIDTH] IN BLOOD BY AUTOMATED COUNT: 42.8 FL (ref 35.9–50)
EST. AVERAGE GLUCOSE BLD GHB EST-MCNC: 100 MG/DL
FOLATE SERPL-MCNC: 22.1 NG/ML
GFR SERPLBLD CREATININE-BSD FMLA CKD-EPI: 126 ML/MIN/1.73 M 2
GLOBULIN SER CALC-MCNC: 2.9 G/DL (ref 1.9–3.5)
GLUCOSE SERPL-MCNC: 81 MG/DL (ref 65–99)
HBA1C MFR BLD: 5.1 % (ref 4–5.6)
HBV CORE AB SERPL QL IA: NONREACTIVE
HBV SURFACE AB SERPL IA-ACNC: <3.5 MIU/ML (ref 0–10)
HBV SURFACE AG SER QL: NORMAL
HCT VFR BLD AUTO: 42.1 % (ref 37–47)
HCV AB SER QL: NORMAL
HDLC SERPL-MCNC: 62 MG/DL
HGB BLD-MCNC: 13.7 G/DL (ref 12–16)
HIV 1+2 AB+HIV1 P24 AG SERPL QL IA: NORMAL
IMM GRANULOCYTES # BLD AUTO: 0.03 K/UL (ref 0–0.11)
IMM GRANULOCYTES NFR BLD AUTO: 0.3 % (ref 0–0.9)
IRON SATN MFR SERPL: 15 % (ref 15–55)
IRON SERPL-MCNC: 65 UG/DL (ref 40–170)
LDLC SERPL CALC-MCNC: 96 MG/DL
LYMPHOCYTES # BLD AUTO: 2.79 K/UL (ref 1–4.8)
LYMPHOCYTES NFR BLD: 32.2 % (ref 22–41)
MCH RBC QN AUTO: 29.3 PG (ref 27–33)
MCHC RBC AUTO-ENTMCNC: 32.5 G/DL (ref 32.2–35.5)
MCV RBC AUTO: 90.1 FL (ref 81.4–97.8)
MONOCYTES # BLD AUTO: 0.51 K/UL (ref 0–0.85)
MONOCYTES NFR BLD AUTO: 5.9 % (ref 0–13.4)
NEUTROPHILS # BLD AUTO: 5 K/UL (ref 1.82–7.42)
NEUTROPHILS NFR BLD: 57.8 % (ref 44–72)
NRBC # BLD AUTO: 0 K/UL
NRBC BLD-RTO: 0 /100 WBC (ref 0–0.2)
PHOSPHATE SERPL-MCNC: 3.1 MG/DL (ref 2.5–4.5)
PLATELET # BLD AUTO: 246 K/UL (ref 164–446)
PMV BLD AUTO: 9.9 FL (ref 9–12.9)
POTASSIUM SERPL-SCNC: 3.9 MMOL/L (ref 3.6–5.5)
PROT SERPL-MCNC: 7.4 G/DL (ref 6–8.2)
RBC # BLD AUTO: 4.67 M/UL (ref 4.2–5.4)
SODIUM SERPL-SCNC: 137 MMOL/L (ref 135–145)
T PALLIDUM AB SER QL IA: NORMAL
TIBC SERPL-MCNC: 429 UG/DL (ref 250–450)
TRIGL SERPL-MCNC: 62 MG/DL (ref 0–149)
TSH SERPL DL<=0.005 MIU/L-ACNC: 1.77 UIU/ML (ref 0.38–5.33)
UIBC SERPL-MCNC: 364 UG/DL (ref 110–370)
WBC # BLD AUTO: 8.7 K/UL (ref 4.8–10.8)

## 2025-04-02 PROCEDURE — 80053 COMPREHEN METABOLIC PANEL: CPT

## 2025-04-02 PROCEDURE — 86708 HEPATITIS A ANTIBODY: CPT

## 2025-04-02 PROCEDURE — 86780 TREPONEMA PALLIDUM: CPT

## 2025-04-02 PROCEDURE — 86803 HEPATITIS C AB TEST: CPT

## 2025-04-02 PROCEDURE — 83550 IRON BINDING TEST: CPT

## 2025-04-02 PROCEDURE — 82746 ASSAY OF FOLIC ACID SERUM: CPT

## 2025-04-02 PROCEDURE — 80061 LIPID PANEL: CPT

## 2025-04-02 PROCEDURE — 36415 COLL VENOUS BLD VENIPUNCTURE: CPT

## 2025-04-02 PROCEDURE — 87389 HIV-1 AG W/HIV-1&-2 AB AG IA: CPT

## 2025-04-02 PROCEDURE — 84439 ASSAY OF FREE THYROXINE: CPT

## 2025-04-02 PROCEDURE — 87591 N.GONORRHOEAE DNA AMP PROB: CPT

## 2025-04-02 PROCEDURE — 85025 COMPLETE CBC W/AUTO DIFF WBC: CPT

## 2025-04-02 PROCEDURE — 83540 ASSAY OF IRON: CPT

## 2025-04-02 PROCEDURE — 84100 ASSAY OF PHOSPHORUS: CPT

## 2025-04-02 PROCEDURE — 82306 VITAMIN D 25 HYDROXY: CPT

## 2025-04-02 PROCEDURE — 84443 ASSAY THYROID STIM HORMONE: CPT

## 2025-04-02 PROCEDURE — 87340 HEPATITIS B SURFACE AG IA: CPT

## 2025-04-02 PROCEDURE — 82607 VITAMIN B-12: CPT

## 2025-04-02 PROCEDURE — 86706 HEP B SURFACE ANTIBODY: CPT

## 2025-04-02 PROCEDURE — 83036 HEMOGLOBIN GLYCOSYLATED A1C: CPT

## 2025-04-02 PROCEDURE — 87491 CHLMYD TRACH DNA AMP PROBE: CPT

## 2025-04-02 PROCEDURE — 86704 HEP B CORE ANTIBODY TOTAL: CPT

## 2025-04-03 LAB
C TRACH DNA SPEC QL NAA+PROBE: NEGATIVE
N GONORRHOEA DNA SPEC QL NAA+PROBE: NEGATIVE
SPECIMEN SOURCE: NORMAL
VIT B12 SERPL-MCNC: 1035 PG/ML (ref 211–911)

## 2025-04-07 LAB — HAV AB SER QL IA: POSITIVE

## 2025-04-10 LAB — T4 FREE SERPL DIALY-MCNC: 1.7 NG/DL (ref 1.1–2.4)
